# Patient Record
Sex: MALE | Race: WHITE | NOT HISPANIC OR LATINO | Employment: OTHER | URBAN - METROPOLITAN AREA
[De-identification: names, ages, dates, MRNs, and addresses within clinical notes are randomized per-mention and may not be internally consistent; named-entity substitution may affect disease eponyms.]

---

## 2024-07-23 ENCOUNTER — TELEPHONE (OUTPATIENT)
Dept: NEUROLOGY | Facility: CLINIC | Age: 69
End: 2024-07-23

## 2024-07-23 ENCOUNTER — APPOINTMENT (OUTPATIENT)
Dept: MRI IMAGING | Facility: HOSPITAL | Age: 69
End: 2024-07-23
Payer: COMMERCIAL

## 2024-07-23 ENCOUNTER — HOSPITAL ENCOUNTER (OUTPATIENT)
Facility: HOSPITAL | Age: 69
Setting detail: OBSERVATION
Discharge: HOME | End: 2024-07-24
Attending: EMERGENCY MEDICINE | Admitting: EMERGENCY MEDICINE
Payer: COMMERCIAL

## 2024-07-23 DIAGNOSIS — Z09 FOLLOW-UP EXAM: ICD-10-CM

## 2024-07-23 DIAGNOSIS — Z86.79 HISTORY OF HYPERTENSION: ICD-10-CM

## 2024-07-23 DIAGNOSIS — R20.2 NUMBNESS AND TINGLING OF RIGHT ARM: Primary | ICD-10-CM

## 2024-07-23 DIAGNOSIS — Z86.73 HISTORY OF CVA (CEREBROVASCULAR ACCIDENT): ICD-10-CM

## 2024-07-23 DIAGNOSIS — I10 HYPERTENSION, UNSPECIFIED TYPE: ICD-10-CM

## 2024-07-23 DIAGNOSIS — R20.0 NUMBNESS AND TINGLING OF RIGHT ARM: Primary | ICD-10-CM

## 2024-07-23 PROBLEM — I63.9 ACUTE CVA (CEREBROVASCULAR ACCIDENT): Status: ACTIVE | Noted: 2024-07-23

## 2024-07-23 LAB
ANION GAP SERPL CALCULATED.3IONS-SCNC: 10 MMOL/L (ref 5–15)
APTT PPP: 26.2 SECONDS (ref 22.7–35.4)
BASOPHILS # BLD AUTO: 0.05 10*3/MM3 (ref 0–0.2)
BASOPHILS NFR BLD AUTO: 0.5 % (ref 0–1.5)
BILIRUB UR QL STRIP: NEGATIVE
BUN SERPL-MCNC: 16 MG/DL (ref 8–23)
BUN/CREAT SERPL: 14.8 (ref 7–25)
CALCIUM SPEC-SCNC: 9 MG/DL (ref 8.6–10.5)
CHLORIDE SERPL-SCNC: 104 MMOL/L (ref 98–107)
CHOLEST SERPL-MCNC: 149 MG/DL (ref 0–200)
CLARITY UR: CLEAR
CO2 SERPL-SCNC: 26 MMOL/L (ref 22–29)
COLOR UR: YELLOW
CREAT SERPL-MCNC: 1.08 MG/DL (ref 0.76–1.27)
DEPRECATED RDW RBC AUTO: 40.3 FL (ref 37–54)
EGFRCR SERPLBLD CKD-EPI 2021: 74.3 ML/MIN/1.73
EOSINOPHIL # BLD AUTO: 0.19 10*3/MM3 (ref 0–0.4)
EOSINOPHIL NFR BLD AUTO: 1.7 % (ref 0.3–6.2)
ERYTHROCYTE [DISTWIDTH] IN BLOOD BY AUTOMATED COUNT: 12.3 % (ref 12.3–15.4)
GEN 5 2HR TROPONIN T REFLEX: 9 NG/L
GLUCOSE BLDC GLUCOMTR-MCNC: 101 MG/DL (ref 70–130)
GLUCOSE SERPL-MCNC: 87 MG/DL (ref 65–99)
GLUCOSE UR STRIP-MCNC: NEGATIVE MG/DL
HBA1C MFR BLD: 5.5 % (ref 4.8–5.6)
HCT VFR BLD AUTO: 47.1 % (ref 37.5–51)
HDLC SERPL-MCNC: 49 MG/DL (ref 40–60)
HGB BLD-MCNC: 15.1 G/DL (ref 13–17.7)
HGB UR QL STRIP.AUTO: NEGATIVE
IMM GRANULOCYTES # BLD AUTO: 0.03 10*3/MM3 (ref 0–0.05)
IMM GRANULOCYTES NFR BLD AUTO: 0.3 % (ref 0–0.5)
INR PPP: 1.04 (ref 0.9–1.1)
KETONES UR QL STRIP: NEGATIVE
LDLC SERPL CALC-MCNC: 86 MG/DL (ref 0–100)
LDLC/HDLC SERPL: 1.76 {RATIO}
LEUKOCYTE ESTERASE UR QL STRIP.AUTO: NEGATIVE
LYMPHOCYTES # BLD AUTO: 4.85 10*3/MM3 (ref 0.7–3.1)
LYMPHOCYTES NFR BLD AUTO: 44.3 % (ref 19.6–45.3)
MAGNESIUM SERPL-MCNC: 2.6 MG/DL (ref 1.6–2.4)
MCH RBC QN AUTO: 28.9 PG (ref 26.6–33)
MCHC RBC AUTO-ENTMCNC: 32.1 G/DL (ref 31.5–35.7)
MCV RBC AUTO: 90.2 FL (ref 79–97)
MONOCYTES # BLD AUTO: 0.76 10*3/MM3 (ref 0.1–0.9)
MONOCYTES NFR BLD AUTO: 6.9 % (ref 5–12)
NEUTROPHILS NFR BLD AUTO: 46.3 % (ref 42.7–76)
NEUTROPHILS NFR BLD AUTO: 5.08 10*3/MM3 (ref 1.7–7)
NITRITE UR QL STRIP: NEGATIVE
NRBC BLD AUTO-RTO: 0 /100 WBC (ref 0–0.2)
PA ADP PRP-ACNC: 130 PRU (ref 194–418)
PH UR STRIP.AUTO: 6.5 [PH] (ref 5–8)
PLATELET # BLD AUTO: 350 10*3/MM3 (ref 140–450)
PMV BLD AUTO: 9.2 FL (ref 6–12)
POTASSIUM SERPL-SCNC: 4 MMOL/L (ref 3.5–5.2)
PROT UR QL STRIP: NEGATIVE
PROTHROMBIN TIME: 13.8 SECONDS (ref 11.7–14.2)
QT INTERVAL: 383 MS
QTC INTERVAL: 420 MS
RBC # BLD AUTO: 5.22 10*6/MM3 (ref 4.14–5.8)
SODIUM SERPL-SCNC: 140 MMOL/L (ref 136–145)
SP GR UR STRIP: 1.01 (ref 1–1.03)
TRIGL SERPL-MCNC: 69 MG/DL (ref 0–150)
TROPONIN T DELTA: 1 NG/L
TROPONIN T SERPL HS-MCNC: 8 NG/L
UROBILINOGEN UR QL STRIP: NORMAL
VLDLC SERPL-MCNC: 14 MG/DL (ref 5–40)
WBC NRBC COR # BLD AUTO: 10.96 10*3/MM3 (ref 3.4–10.8)

## 2024-07-23 PROCEDURE — 80048 BASIC METABOLIC PNL TOTAL CA: CPT | Performed by: EMERGENCY MEDICINE

## 2024-07-23 PROCEDURE — 85025 COMPLETE CBC W/AUTO DIFF WBC: CPT | Performed by: EMERGENCY MEDICINE

## 2024-07-23 PROCEDURE — 80061 LIPID PANEL: CPT | Performed by: PHYSICIAN ASSISTANT

## 2024-07-23 PROCEDURE — 0 GADOBENATE DIMEGLUMINE 529 MG/ML SOLUTION: Performed by: EMERGENCY MEDICINE

## 2024-07-23 PROCEDURE — 81003 URINALYSIS AUTO W/O SCOPE: CPT | Performed by: EMERGENCY MEDICINE

## 2024-07-23 PROCEDURE — A9577 INJ MULTIHANCE: HCPCS | Performed by: EMERGENCY MEDICINE

## 2024-07-23 PROCEDURE — 85576 BLOOD PLATELET AGGREGATION: CPT

## 2024-07-23 PROCEDURE — 85610 PROTHROMBIN TIME: CPT | Performed by: EMERGENCY MEDICINE

## 2024-07-23 PROCEDURE — 82948 REAGENT STRIP/BLOOD GLUCOSE: CPT

## 2024-07-23 PROCEDURE — G0378 HOSPITAL OBSERVATION PER HR: HCPCS

## 2024-07-23 PROCEDURE — 85730 THROMBOPLASTIN TIME PARTIAL: CPT | Performed by: EMERGENCY MEDICINE

## 2024-07-23 PROCEDURE — 83735 ASSAY OF MAGNESIUM: CPT | Performed by: EMERGENCY MEDICINE

## 2024-07-23 PROCEDURE — 93010 ELECTROCARDIOGRAM REPORT: CPT | Performed by: INTERNAL MEDICINE

## 2024-07-23 PROCEDURE — 99204 OFFICE O/P NEW MOD 45 MIN: CPT

## 2024-07-23 PROCEDURE — 36415 COLL VENOUS BLD VENIPUNCTURE: CPT

## 2024-07-23 PROCEDURE — 25810000003 SODIUM CHLORIDE 0.9 % SOLUTION: Performed by: EMERGENCY MEDICINE

## 2024-07-23 PROCEDURE — 99285 EMERGENCY DEPT VISIT HI MDM: CPT

## 2024-07-23 PROCEDURE — 83036 HEMOGLOBIN GLYCOSYLATED A1C: CPT | Performed by: PHYSICIAN ASSISTANT

## 2024-07-23 PROCEDURE — 84484 ASSAY OF TROPONIN QUANT: CPT | Performed by: EMERGENCY MEDICINE

## 2024-07-23 PROCEDURE — 70553 MRI BRAIN STEM W/O & W/DYE: CPT

## 2024-07-23 PROCEDURE — 93005 ELECTROCARDIOGRAM TRACING: CPT | Performed by: EMERGENCY MEDICINE

## 2024-07-23 RX ORDER — ATORVASTATIN CALCIUM 80 MG/1
80 TABLET, FILM COATED ORAL DAILY
COMMUNITY
Start: 2024-07-20

## 2024-07-23 RX ORDER — ONDANSETRON 2 MG/ML
4 INJECTION INTRAMUSCULAR; INTRAVENOUS EVERY 6 HOURS PRN
Status: DISCONTINUED | OUTPATIENT
Start: 2024-07-23 | End: 2024-07-24 | Stop reason: HOSPADM

## 2024-07-23 RX ORDER — CLOPIDOGREL BISULFATE 75 MG/1
75 TABLET ORAL DAILY
COMMUNITY
Start: 2024-07-20

## 2024-07-23 RX ORDER — METHYLPREDNISOLONE 4 MG/1
TABLET ORAL
COMMUNITY
Start: 2024-03-29

## 2024-07-23 RX ORDER — SODIUM CHLORIDE 0.9 % (FLUSH) 0.9 %
10 SYRINGE (ML) INJECTION AS NEEDED
Status: DISCONTINUED | OUTPATIENT
Start: 2024-07-23 | End: 2024-07-24 | Stop reason: HOSPADM

## 2024-07-23 RX ORDER — AMLODIPINE BESYLATE 5 MG/1
5 TABLET ORAL DAILY
Status: DISCONTINUED | OUTPATIENT
Start: 2024-07-23 | End: 2024-07-24 | Stop reason: HOSPADM

## 2024-07-23 RX ORDER — AMLODIPINE BESYLATE 5 MG/1
5 TABLET ORAL DAILY
COMMUNITY
Start: 2024-05-13

## 2024-07-23 RX ORDER — POTASSIUM CHLORIDE 20 MEQ/1
TABLET, EXTENDED RELEASE ORAL
COMMUNITY
Start: 2024-07-20

## 2024-07-23 RX ORDER — DIAZEPAM 5 MG/1
5 TABLET ORAL ONCE
Status: COMPLETED | OUTPATIENT
Start: 2024-07-23 | End: 2024-07-23

## 2024-07-23 RX ORDER — SODIUM CHLORIDE 0.9 % (FLUSH) 0.9 %
10 SYRINGE (ML) INJECTION EVERY 12 HOURS SCHEDULED
Status: DISCONTINUED | OUTPATIENT
Start: 2024-07-23 | End: 2024-07-24 | Stop reason: HOSPADM

## 2024-07-23 RX ORDER — CLOPIDOGREL BISULFATE 75 MG/1
75 TABLET ORAL DAILY
Status: DISCONTINUED | OUTPATIENT
Start: 2024-07-24 | End: 2024-07-23

## 2024-07-23 RX ORDER — SODIUM CHLORIDE 9 MG/ML
40 INJECTION, SOLUTION INTRAVENOUS AS NEEDED
Status: DISCONTINUED | OUTPATIENT
Start: 2024-07-23 | End: 2024-07-24 | Stop reason: HOSPADM

## 2024-07-23 RX ORDER — ATORVASTATIN CALCIUM 80 MG/1
80 TABLET, FILM COATED ORAL DAILY
Status: DISCONTINUED | OUTPATIENT
Start: 2024-07-23 | End: 2024-07-24 | Stop reason: HOSPADM

## 2024-07-23 RX ORDER — ATORVASTATIN CALCIUM 20 MG/1
40 TABLET, FILM COATED ORAL NIGHTLY
Status: DISCONTINUED | OUTPATIENT
Start: 2024-07-23 | End: 2024-07-23

## 2024-07-23 RX ORDER — COLCHICINE 0.6 MG/1
TABLET ORAL
COMMUNITY
Start: 2024-03-29

## 2024-07-23 RX ORDER — CLOPIDOGREL BISULFATE 75 MG/1
75 TABLET ORAL DAILY
Status: DISCONTINUED | OUTPATIENT
Start: 2024-07-24 | End: 2024-07-24 | Stop reason: HOSPADM

## 2024-07-23 RX ADMIN — DIAZEPAM 5 MG: 5 TABLET ORAL at 21:25

## 2024-07-23 RX ADMIN — SODIUM CHLORIDE 1000 ML: 9 INJECTION, SOLUTION INTRAVENOUS at 16:28

## 2024-07-23 RX ADMIN — GADOBENATE DIMEGLUMINE 19 ML: 529 INJECTION, SOLUTION INTRAVENOUS at 22:19

## 2024-07-23 RX ADMIN — AMLODIPINE BESYLATE 5 MG: 5 TABLET ORAL at 21:25

## 2024-07-23 NOTE — ED TRIAGE NOTES
Patient to ED via pv from home. Patient c/o right arm tingling that began this morning around 0600. Wife reports that patient's speech has seemed slurred today as well. Patient reports he had similar symptoms on Friday.

## 2024-07-23 NOTE — ED PROVIDER NOTES
EMERGENCY DEPARTMENT ENCOUNTER    Room Number:  34/34  PCP: Chelsey Palmoo PA  Historian: Patient      HPI:  Chief Complaint: Slurred speech, right arm tingling and weakness  A complete HPI/ROS/PMH/PSH/SH/FH are unobtainable due to: None    Context: Frederick Gross is a 69 y.o. male who presents to the ED via private vehicle c/o acute recurrence of strokelike symptoms, initially diagnosed with a stroke in [place] on Friday.  Had right upper extremity tingling and weakness then with some slight slurred speech.  Has chronic mild right facial droop which appears to be similar.  Has been lightheaded, no spinning dizziness.  No chest pain or shortness of breath.  Symptoms recurred starting this morning around 6 AM.  Was started on Plavix and Lipitor posthospitalization for stroke recently.      MEDICAL RECORD REVIEW    External (non-ED) record review: Accompanying paperwork with patient is reviewed and shows the patient had a left external capsule infarct              PAST MEDICAL HISTORY  Active Ambulatory Problems     Diagnosis Date Noted    No Active Ambulatory Problems     Resolved Ambulatory Problems     Diagnosis Date Noted    No Resolved Ambulatory Problems     No Additional Past Medical History         PAST SURGICAL HISTORY  No past surgical history on file.      FAMILY HISTORY  No family history on file.      SOCIAL HISTORY  Social History     Socioeconomic History    Marital status: Significant Other         ALLERGIES  Aspirin        REVIEW OF SYSTEMS  Review of Systems     All systems reviewed and negative except for those discussed in HPI.       PHYSICAL EXAM    I have reviewed the triage vital signs and nursing notes.    ED Triage Vitals [07/23/24 1548]   Temp Heart Rate Resp BP SpO2   98.3 °F (36.8 °C) 94 16 144/88 96 %      Temp src Heart Rate Source Patient Position BP Location FiO2 (%)   -- -- -- -- --       Physical Exam  General: No acute distress, nontoxic  HEENT: Mucous membranes moist,  atraumatic, EOMI  Neck: Full ROM  Pulm: Symmetric chest rise, nonlabored, lungs CTAB  Cardiovascular: Regular rate and rhythm, intact distal pulses  GI: Soft, nontender, nondistended, no rebound, no guarding, bowel sounds present  MSK: Full ROM, no deformity  Skin: Warm, dry  Neuro: Awake, alert, oriented x 4, GCS 15, mild right facial droop, trace dysarthria, no aphasia, slight sensory decreased in the distal right upper extremity, normal strength bilateral upper and lower extremities, no ataxia, no pronator drift, moving all extremities, no focal deficits  Psych: Calm, cooperative      Interval: baseline  1a. Level of Consciousness: 0-->Alert, keenly responsive  1b. LOC Questions: 0-->Answers both questions correctly  1c. LOC Commands: 0-->Performs both tasks correctly  2. Best Gaze: 0-->Normal  3. Visual: 0-->No visual loss  4. Facial Palsy: 1-->Minor paralysis (flattened nasolabial fold, asymmetry on smiling)  5a. Motor Arm, Left: 0-->No drift, limb holds 90 (or 45) degrees for full 10 secs  5b. Motor Arm, Right: 0-->No drift, limb holds 90 (or 45) degrees for full 10 secs  6a. Motor Leg, Left: 0-->No drift, leg holds 30 degree position for full 5 secs  6b. Motor Leg, Right: 0-->No drift, leg holds 30 degree position for full 5 secs  7. Limb Ataxia: 0-->Absent  8. Sensory: 1-->Mild-to-moderate sensory loss, patient feels pinprick is less sharp or is dull on the affected side, or there is a loss of superficial pain with pinprick, but patient is aware of being touched  9. Best Language: 0-->No aphasia, normal  10. Dysarthria: 1-->Mild-to-moderate dysarthria, patient slurs at least some words and, at worst, can be understood with some difficulty  11. Extinction and Inattention (formerly Neglect): 0-->No abnormality    Total (NIH Stroke Scale): 3      LAB RESULTS  Recent Results (from the past 24 hour(s))   ECG 12 Lead Stroke Evaluation    Collection Time: 07/23/24  4:07 PM   Result Value Ref Range    QT Interval  383 ms    QTC Interval 420 ms   Urinalysis With Microscopic If Indicated (No Culture) - Urine, Clean Catch    Collection Time: 07/23/24  4:21 PM    Specimen: Urine, Clean Catch   Result Value Ref Range    Color, UA Yellow Yellow, Straw    Appearance, UA Clear Clear    pH, UA 6.5 5.0 - 8.0    Specific Gravity, UA 1.012 1.005 - 1.030    Glucose, UA Negative Negative    Ketones, UA Negative Negative    Bilirubin, UA Negative Negative    Blood, UA Negative Negative    Protein, UA Negative Negative    Leuk Esterase, UA Negative Negative    Nitrite, UA Negative Negative    Urobilinogen, UA 1.0 E.U./dL 0.2 - 1.0 E.U./dL   Basic Metabolic Panel    Collection Time: 07/23/24  4:25 PM    Specimen: Blood   Result Value Ref Range    Glucose 87 65 - 99 mg/dL    BUN 16 8 - 23 mg/dL    Creatinine 1.08 0.76 - 1.27 mg/dL    Sodium 140 136 - 145 mmol/L    Potassium 4.0 3.5 - 5.2 mmol/L    Chloride 104 98 - 107 mmol/L    CO2 26.0 22.0 - 29.0 mmol/L    Calcium 9.0 8.6 - 10.5 mg/dL    BUN/Creatinine Ratio 14.8 7.0 - 25.0    Anion Gap 10.0 5.0 - 15.0 mmol/L    eGFR 74.3 >60.0 mL/min/1.73   Protime-INR    Collection Time: 07/23/24  4:25 PM    Specimen: Blood   Result Value Ref Range    Protime 13.8 11.7 - 14.2 Seconds    INR 1.04 0.90 - 1.10   aPTT    Collection Time: 07/23/24  4:25 PM    Specimen: Blood   Result Value Ref Range    PTT 26.2 22.7 - 35.4 seconds   High Sensitivity Troponin T    Collection Time: 07/23/24  4:25 PM    Specimen: Blood   Result Value Ref Range    HS Troponin T 8 <22 ng/L   Magnesium    Collection Time: 07/23/24  4:25 PM    Specimen: Blood   Result Value Ref Range    Magnesium 2.6 (H) 1.6 - 2.4 mg/dL   CBC Auto Differential    Collection Time: 07/23/24  4:25 PM    Specimen: Blood   Result Value Ref Range    WBC 10.96 (H) 3.40 - 10.80 10*3/mm3    RBC 5.22 4.14 - 5.80 10*6/mm3    Hemoglobin 15.1 13.0 - 17.7 g/dL    Hematocrit 47.1 37.5 - 51.0 %    MCV 90.2 79.0 - 97.0 fL    MCH 28.9 26.6 - 33.0 pg    MCHC 32.1 31.5  - 35.7 g/dL    RDW 12.3 12.3 - 15.4 %    RDW-SD 40.3 37.0 - 54.0 fl    MPV 9.2 6.0 - 12.0 fL    Platelets 350 140 - 450 10*3/mm3    Neutrophil % 46.3 42.7 - 76.0 %    Lymphocyte % 44.3 19.6 - 45.3 %    Monocyte % 6.9 5.0 - 12.0 %    Eosinophil % 1.7 0.3 - 6.2 %    Basophil % 0.5 0.0 - 1.5 %    Immature Grans % 0.3 0.0 - 0.5 %    Neutrophils, Absolute 5.08 1.70 - 7.00 10*3/mm3    Lymphocytes, Absolute 4.85 (H) 0.70 - 3.10 10*3/mm3    Monocytes, Absolute 0.76 0.10 - 0.90 10*3/mm3    Eosinophils, Absolute 0.19 0.00 - 0.40 10*3/mm3    Basophils, Absolute 0.05 0.00 - 0.20 10*3/mm3    Immature Grans, Absolute 0.03 0.00 - 0.05 10*3/mm3    nRBC 0.0 0.0 - 0.2 /100 WBC       Ordered the above labs and independently interpreted results. My findings will be discussed in the medical decision making section below        RADIOLOGY  No Radiology Exams Resulted Within Past 24 Hours    Ordered the above noted radiological studies.  Independently interpreted by me and my independent review of findings can be found in the ED Course.  See dictation for official radiology interpretation.      PROCEDURES    Procedures      EKG - Per my independent interpretation at 1610:    EKG Time: 1607  Rhythm/Rate: Sinus rhythm with a rate of 72  Normal axis  Normal intervals  No acute ischemic changes  No STEMI       No prior for comparison      MEDICATIONS GIVEN IN ER    Medications   sodium chloride 0.9 % bolus 1,000 mL (1,000 mL Intravenous New Bag 7/23/24 1628)         PROGRESS, DATA ANALYSIS, CONSULTS, AND MEDICAL DECISION MAKING    Please note that this section constitutes my independent interpretation of clinical data including lab results, radiology, EKG's.  This constitutes my independent professional opinion regarding differential diagnosis and management of this patient.  It may include any factors such as history from outside sources, review of external records, social determinants of health, management of medications, response to those  treatments, and discussions with other providers.    Differential Diagnosis and Plan: Initial concern for recurrence of recent strokelike symptoms, dehydration, renal failure, electrolyte abnormalities, evolving or new stroke, arrhythmia, among others.  Plan for labs, EKG, stat neurology consult, likely repeat MRI and reevaluate with results.    Additional sources:  - Discussed/ obtained information from independent historians:       - (Social Determinants of Health): None     - Shared decision making:  Patient fully updated on and in agreement with the course and plan moving forward    ED Course as of 07/23/24 1719 Tue Jul 23, 2024   1628 Discussed with Dr. Ferreira, stroke neurology, discussed patient's clinical course and findings today, treatment modalities, and will plan for observation unit stay for MRI and neurology consultation.  Does not need further vessel imaging at this time. [DC]   1656 WBC(!): 10.96 [DC]   1656 Hemoglobin: 15.1 [DC]   1656 Platelets: 350 [DC]   1656 Nitrite, UA: Negative [DC]   1656 Leukocytes, UA: Negative [DC]   1657 Blood, UA: Negative [DC]   1657 Ketones, UA: Negative [DC]   1713 Glucose: 87 [DC]   1713 BUN: 16 [DC]   1713 Creatinine: 1.08 [DC]   1713 Sodium: 140 [DC]   1713 Potassium: 4.0 [DC]   1713 INR: 1.04 [DC]   1713 PTT: 26.2 [DC]   1713 HS Troponin T: 8 [DC]   1718 Discussed with MEL Kaye, observation unit, discussed patient's clinical course and findings today, treatment modalities, neurology consult and recommendation, and need for observation unit stay. [DC]   1718 On reevaluation, patient's symptoms have improved though not entirely resolved, he has been updated on the findings and discussions with observation unit and neurology and need for MRI and further monitoring.  All questions and concerns addressed. [DC]      ED Course User Index  [DC] Joaquin Lala MD       Hospitalization Considered?: yes    Orders Placed During This Visit:  Orders Placed This  Encounter   Procedures    MRI Brain Without Contrast    Basic Metabolic Panel    Urinalysis With Microscopic If Indicated (No Culture) - Urine, Clean Catch    Protime-INR    aPTT    High Sensitivity Troponin T    Magnesium    CBC Auto Differential    High Sensitivity Troponin T 2Hr    Inpatient Neurology Consult Stroke    ECG 12 Lead Stroke Evaluation    CBC & Differential       Additional orders considered but not placed:      Independent interpretation of labs, radiology studies, and discussions with consultants: See ED Course        AS OF 17:19 EDT VITALS:    BP - 146/96  HR - 71  TEMP - 98.3 °F (36.8 °C)  02 SATS - 97%          DIAGNOSIS  Final diagnoses:   Numbness and tingling of right arm   History of CVA (cerebrovascular accident)   Hypertension, unspecified type   History of hypertension         DISPOSITION  ED Disposition       ED Disposition   Decision to Admit    Condition   --    Comment   --                Please note that portions of this document were completed with a voice recognition program.    Note Disclaimer: At [facility], we believe that sharing information builds trust and better relationships. You are receiving this note because you recently visited [facility]. It is possible you will see health information before a provider has talked with you about it. This kind of information can be easy to misunderstand. To help you fully understand what it means for your health, we urge you to discuss this note with your provider.                       Joaquin Lala MD  07/23/24 6972

## 2024-07-23 NOTE — H&P
[facility]   HISTORY AND PHYSICAL    Patient Name: Frederick Gross  : 1955  MRN: [id number]  Primary Care Physician:  Chelsey Palomo PA  Date of admission: 2024    Subjective   Subjective     Chief Complaint:   Chief Complaint   Patient presents with    Tingling         HPI:    Frederick Gross is a 69 y.o. male who presented to the ED via private vehicle complaining of acute recurrence of strokelike symptoms.  Patient was initially diagnosed with a stroke in [place] on Friday.  He had upper extremity tingling and weakness and then some slight slurred speech.  Has residual mild facial droop which is reported to be similar.  Patient reports he has been lightheaded but no spinning or dizziness.  No chest pain or shortness of breath.  Symptoms began to recur this morning around 6 AM.  Patient has an ASA allergy [place] started on Plavix and Lipitor when discharged from the hospital in [facility] where he was seen for the initial stroke.  Accompanying paperwork showed that the patient had a left external capsular infarct.  Patient's case was discussed with stroke neurology and they recommended MRI of the brain and admission to the observation unit.    ED workup showed a troponin of 8 9 with a delta of 1, sodium 140, potassium 4, anion gap 10, CO2 26, creatinine 1.08, glucose 87, INR 1.04, PTT 26.2, WBC 10.96, hemoglobin 15.1, platelets 350.  UA was negative.  NIH was a 3.  ECG was done at 1607 and showed sinus with a rate of 72, normal axis, normal intervals, no acute ischemic changes, no STEMI.    Review of Systems   All systems were reviewed and negative except for: That listed above    Personal History     Past Medical History:   Diagnosis Date    Arthritis        Past Surgical History:   Procedure Laterality Date    HERNIA REPAIR      NASAL POLYP SURGERY      TONSILLECTOMY         Family History: family history is not on file. Otherwise pertinent FHx was reviewed and not pertinent to  current issue.    Social History:  reports that he has quit smoking. His smoking use included cigarettes. He does not have any smokeless tobacco history on file. He reports current alcohol use. He reports that he does not use drugs.    Home Medications:  amLODIPine, atorvastatin, clopidogrel, colchicine, diphenhydrAMINE-acetaminophen, methylPREDNISolone, and potassium chloride    Allergies:  Allergies   Allergen Reactions    Aspirin Swelling       Objective   Objective     Vitals:   Temp:  [98.2 °F (36.8 °C)-98.3 °F (36.8 °C)] 98.2 °F (36.8 °C)  Heart Rate:  [65-94] 65  Resp:  [14-16] 14  BP: (144-148)/(79-96) 144/79  Physical Exam    Constitutional: Awake, alert   Eyes: PERRLA, sclerae anicteric, no conjunctival injection   HENT: NCAT, mucous membranes moist   Neck: Supple, no thyromegaly, no lymphadenopathy, trachea midline   Respiratory: Clear to auscultation bilaterally, nonlabored respirations    Cardiovascular: RRR, no murmurs, rubs, or gallops, palpable pedal pulses bilaterally   Gastrointestinal: Positive bowel sounds, soft, nontender, nondistended   Musculoskeletal: No bilateral ankle edema, no clubbing or cyanosis to extremities   Psychiatric: Appropriate affect, cooperative   Neurologic: Oriented x 4, mild right facial droop, trace dysarthria, no aphasia, slight sensory decreased in the distal right upper extremity, normal strength bilateral upper and lower extremities, no ataxia, no drift, moves all extremities   skin: No rashes     Result Review    Result Review:  I have personally reviewed the results from the time of this admission to 7/23/2024 19:50 EDT and agree with these findings:  [x]  Laboratory list / accordion  []  Microbiology  [x]  Radiology  [x]  EKG/Telemetry   []  Cardiology/Vascular   []  Pathology  []  Old records  []  Other:        Assessment & Plan   Assessment / Plan     Brief Patient Summary:  Frederick Gross is a 69 y.o. male who was admitted to the ED observation unit for worsening  stroke symptoms to include slurred speech and right arm tingling and weakness.  Stroke neurology following and recommended MRI brain without contrast to further evaluate.  Patient has ASA allergy and is taking Plavix and Lipitor daily.    Active Hospital Problems:  Active Hospital Problems    Diagnosis     **Acute CVA (cerebrovascular accident)      Plan:     Left external capsular infarct/slurred speech, right arm tingling and weakness  -MRI brain to further evaluate  -Neurology following  -Continue Lipitor and Plavix  -Serial neurochecks  -Cardiac monitoring    Hyperlipidemia  -Continue Plavix as above    History of hypokalemia  -Trend potassium and p.o. potassium if needed  -Repeat BMP in the morning      VTE Prophylaxis:  Mechanical VTE prophylaxis orders are present.        CODE STATUS:    Level Of Support Discussed With: Patient  Code Status (Patient has no pulse and is not breathing): CPR (Attempt to Resuscitate)  Medical Interventions (Patient has pulse or is breathing): Full Support    Admission Status:  I believe this patient meets observation status.    Electronically signed by Gagandeep Dubon III, PA, 07/23/24, 6:59 PM EDT.        75 minutes has been spent by [facility] providers in the care of this patient while under observation status      I have worn appropriate PPE during this patient encounter, sanitized my hands both with entering and exiting patient's room.    I have discussed plan of care with patient including advance care plan and/or surrogate decision maker.  Patient advises that their significant other/Maria Del Rosario White will be their primary surrogate decision maker

## 2024-07-23 NOTE — TELEPHONE ENCOUNTER
Called patient to schedule him with Dr. Madden on 8/26/2024.  Patient felt that he needed to be seen sooner, no sooner appointments available at this time.  Patient stated that he will find another provider.  
EOAE (evoked otoacoustic emission)

## 2024-07-23 NOTE — CONSULTS
Neurology Consult Note    Consult Date: 7/23/2024    Referring MD: No ref. provider found    Reason for Consult I have been asked to see the patient in neurological consultation to render advice and opinion regarding right arm numbness, slurred speech and facial droop.    Frederick Gross is a 69 y.o. male with PMH of HTN and arthritis presents to the ED with right arm numbness and slurred speech since 0600 this morning. He states this symptoms primarily involved his forearm and all of his finger tips on his right side. He said he also noticed he had difficulty writing. Wife said she noticed slurred speech but the patient did not appreciate this. He denies any unilateral weakness, dizziness, speech or visual deficits. He states symptoms had resolved around 3 pm today. BP on arrival was 144/88 mmHg and pulse was 94 bpm. Blood glucose was 87 on arrival. Patient states he had not drank much water over the last couple of days and that he drinks a cup of coffee daily and yesterday had a tea. He denies alcohol use or tobacco use.     Patient state he was seen for the same symptoms last Friday which had resolved yesterday. He was seen in [place] for these symptoms that started when him and his wife were driving down to the [place]. He had MRI brain at the time which demonstrated left internal capsule stroke. He was discharged on Plavix 75 mg daily and Lipitor 80 mg daily. He cannot take aspirin due to NSAID allergy.     Past Medical/Surgical Hx:  Past Medical History:   Diagnosis Date    Arthritis      Past Surgical History:   Procedure Laterality Date    HERNIA REPAIR      NASAL POLYP SURGERY      TONSILLECTOMY         Medications On Admission  Medications Prior to Admission   Medication Sig Dispense Refill Last Dose    amLODIPine (NORVASC) 5 MG tablet Take 1 tablet by mouth Daily.       atorvastatin (LIPITOR) 80 MG tablet Take 1 tablet by mouth Daily.       clopidogrel (PLAVIX) 75 MG tablet Take 1 tablet by mouth Daily.   " 7/23/2024    colchicine 0.6 MG tablet Take 2 now and repeat with one in one hour.       methylPREDNISolone (MEDROL) 4 MG dose pack        potassium chloride (KLOR-CON M20) 20 MEQ CR tablet        diphenhydrAMINE-acetaminophen (TYLENOL PM)  MG tablet per tablet Take 1 tablet by mouth.          Allergies:  Allergies   Allergen Reactions    Aspirin Swelling       Social Hx:  Social History     Socioeconomic History    Marital status: Significant Other   Tobacco Use    Smoking status: Former     Types: Cigarettes   Vaping Use    Vaping status: Never Used   Substance and Sexual Activity    Alcohol use: Yes     Comment: beers a week    Drug use: Never    Sexual activity: Yes       Family Hx:  History reviewed. No pertinent family history.      Exam    /79 (BP Location: Left arm, Patient Position: Lying)   Pulse 65   Temp 98.2 °F (36.8 °C) (Oral)   Resp 14   Ht 175.3 cm (69\")   Wt 90.7 kg (200 lb)   SpO2 95%   BMI 29.53 kg/m²   gen: NAD, vitals reviewed  MS: oriented x3, recent/remote memory intact, normal attention/concentration, language intact, no neglect, normal fund of knowledge  CN: visual acuity grossly normal, visual fields full, PERRL, EOMI, facial sensation equal, mild right facial droop, hearing symmetric, palate elevates symmetrically, shoulder shrug equal, tongue midline  Motor: 5/5 throughout upper and lower extremities, normal tone  Sensation: intact to vibration and cold temperature throughout  Coordination: no dysmetria with finger to nose or heel to shin bilaterally, normal finger to thumb tap bilaterally, slightly revolving left hand around right hand.   Gait: no ataxia, normal station    DATA:    Lab Results   Component Value Date    GLUCOSE 87 07/23/2024    CALCIUM 9.0 07/23/2024     07/23/2024    K 4.0 07/23/2024    CO2 26.0 07/23/2024     07/23/2024    BUN 16 07/23/2024    CREATININE 1.08 07/23/2024    BCR 14.8 07/23/2024    ANIONGAP 10.0 07/23/2024     Lab Results "   Component Value Date    WBC 10.96 (H) 07/23/2024    HGB 15.1 07/23/2024    HCT 47.1 07/23/2024    MCV 90.2 07/23/2024     07/23/2024     Lab Results   Component Value Date    LDL 86 07/23/2024     Lab Results   Component Value Date    HGBA1C 5.50 07/23/2024     Lab Results   Component Value Date    INR 1.04 07/23/2024    PROTIME 13.8 07/23/2024       Lab review:   Glucose 87  WBC 10.96    Imaging review:     Diagnoses:  Transient Right arm numbness with dysarthria: Patient diagnosed with left internal capsule infarct Friday in [facility]. He was discharged on Plavix and Lipitor. Patient was asymptomatic since Sunday and developed symptoms from 6 am to 3 p, today now fully resolved. Patient admits to not drinking much water over the past couple of day and drinking coffee daily. Patient symptoms could possibly be recrudescence of previous stroke or could be new stroke so will obtain repeat Brain MRI with and without contrast. Would be great to get  imaging from Friday uploaded into epic as well.  HTN  Arthritis    PLAN:   Continue Plavix 75 mg daily and Lipitor 80 mg daily  MRI brain with and without contrast  Maintain permissive HTN for 24-48hrs, do not treat unless BP > 220/120 if no contraindication  Perform bedside swallow test  Telemetry to monitor for arrhythmia  VTE prophylaxis  Neuro checks, if change in exam call neuro oncall  PT/OT when appropriate   P2Y12 response  Would be great to have previous MRI brain, CTA head and neck, carotid ultrasound and CTH uploaded into chart.  Educated to avoid dehydration and hypotension. Drink at least 2 L of water daily, limit caffeine and alcohol    Recommendations discussed with Dr. Madden who is in agreement with plan.

## 2024-07-23 NOTE — ED NOTES
"..Nursing report ED to floor  Frederick Gross  69 y.o.  male    HPI :  HPI (Adult)  Stated Reason for Visit: right arm tingling, onset 4 days ago  History Obtained From: patient    Chief Complaint  Chief Complaint   Patient presents with    Tingling       Admitting doctor:   Enrrique Bingham MD    Admitting diagnosis:   The primary encounter diagnosis was Numbness and tingling of right arm. Diagnoses of History of CVA (cerebrovascular accident), Hypertension, unspecified type, and History of hypertension were also pertinent to this visit.    Code status:   Current Code Status       Date Active Code Status Order ID Comments User Context       7/23/2024 1738 CPR (Attempt to Resuscitate) [id number]  Gagandeep Dubon III, PA ED        Question Answer    Code Status (Patient has no pulse and is not breathing) CPR (Attempt to Resuscitate)    Medical Interventions (Patient has pulse or is breathing) Full Support    Level Of Support Discussed With Patient                    Allergies:   Aspirin    Isolation:   No active isolations    Intake and Output    Intake/Output Summary (Last 24 hours) at 7/23/2024 1744  Last data filed at 7/23/2024 1728  Gross per 24 hour   Intake 1000 ml   Output --   Net 1000 ml       Weight:       07/23/24  1627   Weight: 90.7 kg (200 lb)       Most recent vitals:   Vitals:    07/23/24 1548 07/23/24 1601 07/23/24 1627 07/23/24 1632   BP: 144/88 148/93  146/96   Pulse: 94 76  71   Resp: 16      Temp: 98.3 °F (36.8 °C)      SpO2: 96% 98%  97%   Weight:   90.7 kg (200 lb)    Height:   175.3 cm (69\")        Active LDAs/IV Access:   Lines, Drains & Airways       Active LDAs       Name Placement date Placement time Site Days    Peripheral IV 07/23/24 1625 Right Antecubital 07/23/24  1625  Antecubital  less than 1                    Labs (abnormal labs have a star):   Labs Reviewed   MAGNESIUM - Abnormal; Notable for the following components:       Result Value    Magnesium 2.6 (*)     All other " components within normal limits   CBC WITH AUTO DIFFERENTIAL - Abnormal; Notable for the following components:    WBC 10.96 (*)     Lymphocytes, Absolute 4.85 (*)     All other components within normal limits   BASIC METABOLIC PANEL - Normal    Narrative:     GFR Normal >60  Chronic Kidney Disease <60  Kidney Failure <15     URINALYSIS W/ MICROSCOPIC IF INDICATED (NO CULTURE) - Normal    Narrative:     Urine microscopic not indicated.   PROTIME-INR - Normal   APTT - Normal   TROPONIN - Normal    Narrative:     High Sensitive Troponin T Reference Range:  <14.0 ng/L- Negative Female for AMI  <22.0 ng/L- Negative Male for AMI  >=14 - Abnormal Female indicating possible myocardial injury.  >=22 - Abnormal Male indicating possible myocardial injury.   Clinicians would have to utilize clinical acumen, EKG, Troponin, and serial changes to determine if it is an Acute Myocardial Infarction or myocardial injury due to an underlying chronic condition.        HIGH SENSITIVITIY TROPONIN T 2HR   HEMOGLOBIN A1C   LIPID PANEL   POCT GLUCOSE FINGERSTICK   POCT GLUCOSE FINGERSTICK   POCT GLUCOSE FINGERSTICK   POCT GLUCOSE FINGERSTICK   CBC AND DIFFERENTIAL    Narrative:     The following orders were created for panel order CBC & Differential.  Procedure                               Abnormality         Status                     ---------                               -----------         ------                     CBC Auto Differential[[id number]]        Abnormal            Final result                 Please view results for these tests on the individual orders.       EKG:   ECG 12 Lead Stroke Evaluation   Preliminary Result   HEART RATE=72  bpm   RR Iqiqkoim=270  ms   TX Bvceojsr=679  ms   P Horizontal Axis=-5  deg   P Front Axis=-6  deg   QRSD Interval=94  ms   QT Xvvfzxny=301  ms   OFbG=700  ms   QRS Axis=22  deg   T Wave Axis=47  deg   - NORMAL ECG -   Sinus rhythm   Date and Time of Study:2024-07-23 16:07:41          Meds given  in ED:   Medications   diazePAM (VALIUM) tablet 5 mg (has no administration in time range)   sodium chloride 0.9 % flush 10 mL (has no administration in time range)   sodium chloride 0.9 % flush 10 mL (has no administration in time range)   sodium chloride 0.9 % infusion 40 mL (has no administration in time range)   atorvastatin (LIPITOR) tablet 40 mg (has no administration in time range)   ondansetron (ZOFRAN) injection 4 mg (has no administration in time range)   clopidogrel (PLAVIX) tablet 75 mg (has no administration in time range)   sodium chloride 0.9 % bolus 1,000 mL (0 mL Intravenous Stopped 7/23/24 2607)       Imaging results:  No radiology results for the last day    Ambulatory status:   - up ad melanie      Social issues:   Social History     Socioeconomic History    Marital status: Significant Other       Peripheral Neurovascular  Peripheral Neurovascular (Adult)  Peripheral Neurovascular WDL: (S) .WDL except, neurovascular assessment upper (stroke on friday. on blood thinners. woke up this morning with tingling in rt arm.)  RUE Neurovascular Assessment  Color RUE: no discoloration  Sensation RUE: tingling present (reports weakness when writing today)    Neuro Cognitive  Neuro Cognitive (Adult)  Cognitive/Neuro/Behavioral WDL: WDL, orientation, speech  Orientation: oriented x 4  Speech: logical, clear  Additional Documentation: Dizziness Assessment (Group)  NIH Stroke Scale  Interval: baseline  1a. Level of Consciousness: 0-->Alert, keenly responsive  1b. LOC Questions: 0-->Answers both questions correctly  1c. LOC Commands: 0-->Performs both tasks correctly  2. Best Gaze: 0-->Normal  3. Visual: 0-->No visual loss  4. Facial Palsy: 1-->Minor paralysis (flattened nasolabial fold, asymmetry on smiling)  5a. Motor Arm, Left: 0-->No drift, limb holds 90 (or 45) degrees for full 10 secs  5b. Motor Arm, Right: 0-->No drift, limb holds 90 (or 45) degrees for full 10 secs  6a. Motor Leg, Left: 0-->No drift, leg  holds 30 degree position for full 5 secs  6b. Motor Leg, Right: 0-->No drift, leg holds 30 degree position for full 5 secs  7. Limb Ataxia: 0-->Absent  8. Sensory: 1-->Mild-to-moderate sensory loss, patient feels pinprick is less sharp or is dull on the affected side, or there is a loss of superficial pain with pinprick, but patient is aware of being touched  9. Best Language: 0-->No aphasia, normal  10. Dysarthria: 1-->Mild-to-moderate dysarthria, patient slurs at least some words and, at worst, can be understood with some difficulty  11. Extinction and Inattention (formerly Neglect): 0-->No abnormality  Total (NIH Stroke Scale): 3  Dizziness Assessment  Dizziness Reported Symptoms: waves of dizziness, weak    Learning  Learning Assessment (Adult)  Learning Readiness and Ability: no barriers identified    Respiratory  Respiratory WDL  Respiratory WDL: WDL    Abdominal Pain       Pain Assessments  Pain (Adult)  (0-10) Pain Rating: Rest: 0  (0-10) Pain Rating: Activity: 0    NIH Stroke Scale  NIH Stroke Scale  Interval: baseline  1a. Level of Consciousness: 0-->Alert, keenly responsive  1b. LOC Questions: 0-->Answers both questions correctly  1c. LOC Commands: 0-->Performs both tasks correctly  2. Best Gaze: 0-->Normal  3. Visual: 0-->No visual loss  4. Facial Palsy: 1-->Minor paralysis (flattened nasolabial fold, asymmetry on smiling)  5a. Motor Arm, Left: 0-->No drift, limb holds 90 (or 45) degrees for full 10 secs  5b. Motor Arm, Right: 0-->No drift, limb holds 90 (or 45) degrees for full 10 secs  6a. Motor Leg, Left: 0-->No drift, leg holds 30 degree position for full 5 secs  6b. Motor Leg, Right: 0-->No drift, leg holds 30 degree position for full 5 secs  7. Limb Ataxia: 0-->Absent  8. Sensory: 1-->Mild-to-moderate sensory loss, patient feels pinprick is less sharp or is dull on the affected side, or there is a loss of superficial pain with pinprick, but patient is aware of being touched  9. Best Language:  0-->No aphasia, normal  10. Dysarthria: 1-->Mild-to-moderate dysarthria, patient slurs at least some words and, at worst, can be understood with some difficulty  11. Extinction and Inattention (formerly Neglect): 0-->No abnormality  Total (NIH Stroke Scale): 3    Francesca Goode RN  07/23/24 17:44 EDT

## 2024-07-24 ENCOUNTER — APPOINTMENT (OUTPATIENT)
Dept: CARDIOLOGY | Facility: HOSPITAL | Age: 69
End: 2024-07-24
Payer: COMMERCIAL

## 2024-07-24 ENCOUNTER — READMISSION MANAGEMENT (OUTPATIENT)
Dept: CALL CENTER | Facility: HOSPITAL | Age: 69
End: 2024-07-24
Payer: COMMERCIAL

## 2024-07-24 VITALS
HEART RATE: 67 BPM | DIASTOLIC BLOOD PRESSURE: 87 MMHG | BODY MASS INDEX: 29.62 KG/M2 | SYSTOLIC BLOOD PRESSURE: 138 MMHG | WEIGHT: 200 LBS | OXYGEN SATURATION: 100 % | RESPIRATION RATE: 18 BRPM | TEMPERATURE: 97.7 F | HEIGHT: 69 IN

## 2024-07-24 LAB
ALBUMIN SERPL-MCNC: 4 G/DL (ref 3.5–5.2)
ALBUMIN/GLOB SERPL: 1.3 G/DL
ALP SERPL-CCNC: 74 U/L (ref 39–117)
ALT SERPL W P-5'-P-CCNC: 16 U/L (ref 1–41)
ANION GAP SERPL CALCULATED.3IONS-SCNC: 9 MMOL/L (ref 5–15)
AST SERPL-CCNC: 17 U/L (ref 1–40)
BILIRUB SERPL-MCNC: 0.6 MG/DL (ref 0–1.2)
BUN SERPL-MCNC: 12 MG/DL (ref 8–23)
BUN/CREAT SERPL: 14 (ref 7–25)
CALCIUM SPEC-SCNC: 9 MG/DL (ref 8.6–10.5)
CHLORIDE SERPL-SCNC: 102 MMOL/L (ref 98–107)
CO2 SERPL-SCNC: 25 MMOL/L (ref 22–29)
CREAT SERPL-MCNC: 0.86 MG/DL (ref 0.76–1.27)
DEPRECATED RDW RBC AUTO: 40.6 FL (ref 37–54)
EGFRCR SERPLBLD CKD-EPI 2021: 93.7 ML/MIN/1.73
ERYTHROCYTE [DISTWIDTH] IN BLOOD BY AUTOMATED COUNT: 12.3 % (ref 12.3–15.4)
GLOBULIN UR ELPH-MCNC: 3.1 GM/DL
GLUCOSE SERPL-MCNC: 99 MG/DL (ref 65–99)
HCT VFR BLD AUTO: 50.2 % (ref 37.5–51)
HGB BLD-MCNC: 16.6 G/DL (ref 13–17.7)
MCH RBC QN AUTO: 29.9 PG (ref 26.6–33)
MCHC RBC AUTO-ENTMCNC: 33.1 G/DL (ref 31.5–35.7)
MCV RBC AUTO: 90.5 FL (ref 79–97)
PLATELET # BLD AUTO: 358 10*3/MM3 (ref 140–450)
PMV BLD AUTO: 9.1 FL (ref 6–12)
POTASSIUM SERPL-SCNC: 3.9 MMOL/L (ref 3.5–5.2)
PROT SERPL-MCNC: 7.1 G/DL (ref 6–8.5)
RBC # BLD AUTO: 5.55 10*6/MM3 (ref 4.14–5.8)
SODIUM SERPL-SCNC: 136 MMOL/L (ref 136–145)
WBC NRBC COR # BLD AUTO: 9.77 10*3/MM3 (ref 3.4–10.8)

## 2024-07-24 PROCEDURE — G0378 HOSPITAL OBSERVATION PER HR: HCPCS

## 2024-07-24 PROCEDURE — 80053 COMPREHEN METABOLIC PANEL: CPT | Performed by: PHYSICIAN ASSISTANT

## 2024-07-24 PROCEDURE — 93246 EXT ECG>7D<15D RECORDING: CPT

## 2024-07-24 PROCEDURE — 92610 EVALUATE SWALLOWING FUNCTION: CPT | Performed by: SPEECH-LANGUAGE PATHOLOGIST

## 2024-07-24 PROCEDURE — 85027 COMPLETE CBC AUTOMATED: CPT | Performed by: PHYSICIAN ASSISTANT

## 2024-07-24 PROCEDURE — 99214 OFFICE O/P EST MOD 30 MIN: CPT | Performed by: PSYCHIATRY & NEUROLOGY

## 2024-07-24 PROCEDURE — 36415 COLL VENOUS BLD VENIPUNCTURE: CPT | Performed by: PHYSICIAN ASSISTANT

## 2024-07-24 RX ADMIN — Medication 10 ML: at 08:31

## 2024-07-24 RX ADMIN — CLOPIDOGREL BISULFATE 75 MG: 75 TABLET, FILM COATED ORAL at 08:31

## 2024-07-24 NOTE — PROGRESS NOTES
ABBEY LAL Attestation Note    I supervised care provided by the midlevel provider.    The RUI and I have discussed this patient's history, physical exam, and treatment plan. I have reviewed the documentation and personally had a face to face interaction with the patient  I affirm the documentation and agree with the treatment and plan. I provided a substantive portion of the care of this patient.  I personally performed the physical exam, in its entirety.  My personal findings are documented in below:    History:  Patient with recent diagnosis of internal capsule stroke diagnosed at [facility] on Friday presents for evaluation of numbness in the right upper extremity, lightheaded feeling and some slurring of speech.  He first noticed the symptoms this morning after waking up.  He became concerned that he was having another stroke and then he says that his anxiety has began to escalate.  At the time of my evaluation he says he is feeling better now.  He feels like his speech has resolved back to normal.  He is not experiencing any weakness at this time.    Physical Exam:  General: No acute distress.  HENT: NCAT, PERRL, Nares patent.  Eyes: no scleral icterus.  Neck: trachea midline, no ROM limitations.  CV: Pink warm and well-perfused throughout  Respiratory: No distress or increased work of breathing  Abdomen: soft, no focal tenderness or rigidity  Musculoskeletal: no deformity.  Neuro: alert, moves all extremities, follows commands.  No facial droop.  Speech is clear and articulate.  No focal motor deficits  Skin: warm, dry.    Assessment and Plan:  Episode of change in speech /right upper extremity numbness and weakness: Neurology consulted.  MRI brain ordered.  Continue Lipitor and Plavix.  Serial neurochecks

## 2024-07-24 NOTE — PROGRESS NOTES
"DOS: 2024  NAME: Frederick Gorss   : 1955  PCP: Chelsey Palomo PA  Chief Complaint   Patient presents with    Tingling       Chief complaint: Stroke  Subjective: History confirmed as documented in Elodia Ayon's note.  Last Friday he developed slurred speech and right-sided weakness and was found to have a left subcortical infarct.  He thinks he was quite stressed at the time as they were in the midst of traveling.  Years ago he thinks he probably had fairly poorly controlled hypertension but this has come under good control recently with Norvasc over the last several years.  At the hospital he was found to have a left subcortical stroke.  He was started on Plavix.  He has an aspirin allergy.  Since coming home he felt mostly well but yesterday developed worsening right-sided weakness and numbness which prompted him to come back to the hospital.    Objective:  Vital signs: /87 (BP Location: Right arm, Patient Position: Lying)   Pulse 67   Temp 97.7 °F (36.5 °C) (Oral)   Resp 18   Ht 175.3 cm (69\")   Wt 90.7 kg (200 lb)   SpO2 100%   BMI 29.53 kg/m²    Gen: NAD, vitals reviewed  MS: oriented x3, recent/remote memory intact, normal attention/concentration, language intact, no neglect.  CN: visual acuity grossly normal, PERRL, EOMI, right facial droop, no dysarthria  Motor: Very mild right upper extremity drift, 5/5 throughout upper and lower extremities, normal tone  Sensory: intact to cold temperature and vibration throughout  Reflexes: Slightly brisk right upper extremity    Laboratory results:  Lab Results   Component Value Date    GLUCOSE 99 2024    CALCIUM 9.0 2024     2024    K 3.9 2024    CO2 25.0 2024     2024    BUN 12 2024    CREATININE 0.86 2024    BCR 14.0 2024    ANIONGAP 9.0 2024     Lab Results   Component Value Date    WBC 9.77 2024    HGB 16.6 2024    HCT 50.2 2024    MCV 90.5 2024 "     07/24/2024     Lab Results   Component Value Date    LDL 86 07/23/2024         Lab 07/23/24  1625   HEMOGLOBIN A1C 5.50        Review of labs: CBC, BMP unremarkable, LDL 86, p2y12 130    Review and interpretation of imaging: I personally reviewed his MRI brain performed during this hospital stay which shows a subacute left subcortical infarct as well as another subacute right frontal corona radiata infarct.  CTA and MRI from the outside facility obtained and reviewed.  CTA shows no significant intracranial or extracranial stenosis.  The MRI does not show the right frontal corona radiata stroke.  MRI also shows fairly advanced white matter disease consistent with hypertension    Diagnoses:  Stroke, left and right subcortical, due to small vessel disease  Cerebral white matter disease  Mixed hyperlipidemia    Comment: Subacute left and right subcortical strokes.  The second stroke in the right frontal lobe was not visualized on the initial MRI in [place].  This may have been technical.  I think both of these lesions are probably lacunar infarcts however given the bilaterality I would prefer to check a Holter monitor on discharge.  He has been prone to episodic dizziness in the past.    Plan:  1.  Continue Plavix, statin  2.  2-week Holter on discharge  3.  I recommend he remain off work until 4 weeks post stroke.    Management discussed with patient, family, Dr. Kevin Hernandez for discharge from a neurology standpoint.  Follow-up with me in a few months    Total visit time 50 minutes including chart/imaging review, patient assessment, care coordination, patient/family education.

## 2024-07-24 NOTE — PLAN OF CARE
Goal Outcome Evaluation:  Plan of Care Reviewed With: patient, spouse           Outcome Evaluation: Clinical swallow evaluation completed recommend regular solids and thin liquids, meds whole as tolerated and small bites and sips.      Anticipated Discharge Disposition (SLP): home          SLP Swallowing Diagnosis: swallow WFL/no suspected pharyngeal impairment (07/24/24 1000)

## 2024-07-24 NOTE — THERAPY EVALUATION
Acute Care - Speech Language Pathology   Swallow Initial Evaluation [facility]     Patient Name: Frederick Gross  : 1955  MRN: [id number]  Today's Date: 2024               Admit Date: 2024    Visit Dx:     ICD-10-CM ICD-9-CM   1. Numbness and tingling of right arm  R20.0 782.0    R20.2    2. History of CVA (cerebrovascular accident)  Z86.73 V12.54   3. Hypertension, unspecified type  I10 401.9   4. History of hypertension  Z86.79 V12.59     Patient Active Problem List   Diagnosis    Acute CVA (cerebrovascular accident)     Past Medical History:   Diagnosis Date    Arthritis      Past Surgical History:   Procedure Laterality Date    HERNIA REPAIR      NASAL POLYP SURGERY      TONSILLECTOMY         SLP Recommendation and Plan  SLP Swallowing Diagnosis: swallow WFL/no suspected pharyngeal impairment (24 1000)  SLP Diet Recommendation: regular textures, thin liquids (24 1000)  Recommended Precautions and Strategies: small bites of food and sips of liquid, upright posture during/after eating (24 1000)  SLP Rec. for Method of Medication Administration: meds whole, as tolerated (24 1000)     Monitor for Signs of Aspiration: yes, notify SLP if any concerns (24 1000)  Recommended Diagnostics: SLE/Cog/Motor Speech Evaluation (24 1000)  Swallow Criteria for Skilled Therapeutic Interventions Met: no problems identified which require skilled intervention (24 1000)  Anticipated Discharge Disposition (SLP): home (24 1000)     Therapy Frequency (Swallow): evaluation only (24 1000)     Oral Care Recommendations: Oral Care BID/PRN (24 1000)                                        Plan of Care Reviewed With: patient, spouse  Outcome Evaluation: Clinical swallow evaluation completed recommend regular solids and thin liquids, meds whole as tolerated and small bites and sips.      SWALLOW EVALUATION (Last 72 Hours)       SLP Adult Swallow Evaluation       Row  "Name 07/24/24 1000                   Rehab Evaluation    Document Type evaluation  -KA        Subjective Information no complaints  -KA        Patient Observations alert;cooperative  -KA        Patient Effort good  -KA        Symptoms Noted During/After Treatment none  -KA           General Information    Patient Profile Reviewed yes  -KA        Pertinent History Of Current Problem pt admitted with transient dysarthria MRI revealed \"The patient is noted to have 2 acute infarcts. One is within the left  corona radiata/basal ganglia, and the other is noted within the right  frontal corona radiata. No abnormal enhancement. \" Patient reports his symptoms have resolved  -KA        Current Method of Nutrition regular textures;thin liquids  -KA        Precautions/Limitations, Vision WFL  -KA        Precautions/Limitations, Hearing WFL  -KA        Prior Level of Function-Communication WFL  -KA        Prior Level of Function-Swallowing no diet consistency restrictions;safe, efficient swallowing in all situations  -KA        Plans/Goals Discussed with patient  -KA        Barriers to Rehab none identified  -KA        Patient's Goals for Discharge return home  -KA        Family Goals for Discharge family did not state  -KA           Oral Musculature and Cranial Nerve Assessment    Oral Labial or Buccal Impairment, Detail, Cranial Nerve VII (Facial): right labial droop;other (see comments)  question slight right labial droop during lateralization  -KA           General Eating/Swallowing Observations    Respiratory Support Currently in Use room air  -KA        Eating/Swallowing Skills self-fed  -KA        Positioning During Eating upright in bed  -KA        Utensils Used spoon;cup;straw  -KA        Consistencies Trialed regular textures;soft to chew textures;chopped;mixed consistency;pureed;thin liquids  -KA           Clinical Swallow Eval    Clinical Swallow Evaluation Summary Pt demonstrated no overt s/s of pen/asp with thins " via cup and straw, puree, mechanical soft/mixed, regular solids. Mastication WNL and adequate laryngeal elevation  -           SLP Evaluation Clinical Impression    SLP Swallowing Diagnosis swallow WFL/no suspected pharyngeal impairment  -        Functional Impact no impact on function  -        Swallow Criteria for Skilled Therapeutic Interventions Met no problems identified which require skilled intervention  -           Recommendations    Therapy Frequency (Swallow) evaluation only  -        SLP Diet Recommendation regular textures;thin liquids  -        Recommended Diagnostics SLE/Cog/Motor Speech Evaluation  -        Recommended Precautions and Strategies small bites of food and sips of liquid;upright posture during/after eating  -        Oral Care Recommendations Oral Care BID/PRN  -        SLP Rec. for Method of Medication Administration meds whole;as tolerated  -        Monitor for Signs of Aspiration yes;notify SLP if any concerns  -        Anticipated Discharge Disposition (SLP) home  -                  User Key  (r) = Recorded By, (t) = Taken By, (c) = Cosigned By      Initials Name Effective Dates    Madan Galindo SLP 01/05/24 -                     EDUCATION  The patient has been educated in the following areas:   Dysphagia (Swallowing Impairment).                Time Calculation:    Time Calculation- SLP       Row Name 07/24/24 1038             Time Calculation- SLP    SLP Start Time 0900  -KA      SLP Received On 07/24/24  -         Untimed Charges    25692-HL Eval Oral Pharyng Swallow Minutes 55  -KA         Total Minutes    Untimed Charges Total Minutes 55  -KA       Total Minutes 55  -KA                User Key  (r) = Recorded By, (t) = Taken By, (c) = Cosigned By      Initials Name Provider Type    Madan Galindo SLP Speech and Language Pathologist                    Therapy Charges for Today       Code Description Service Date Service Provider Modifiers Qty     [id number] [facility] ORAL PHARYNG SWALLOW 4 7/24/2024 Madan Wells, SLP GN 1                 DENNY Lisa  7/24/2024

## 2024-07-24 NOTE — PROGRESS NOTES
MD ATTESTATION NOTE    The RUI and I have discussed this patient's history, physical exam, and treatment plan.  I have reviewed the documentation and personally had a face to face interaction with the patient. I affirm the documentation and agree with the treatment and plan.  The attached note describes my personal findings.      I provided a substantive portion of the care of the patient.  I personally performed the physical exam in its entirety, and below are my findings.        Brief HPI: This patient is a 69-year-old male with a recent diagnosis of an internal capsule stroke at an outlying facility admitted to the observation unit due to a recurrent strokelike symptoms.  He did report some numbness and tingling to his right upper extremity as well as some slurring of speech.  This morning, he reports that all symptoms have resolved and he feels back to his baseline.      PHYSICAL EXAM  ED Triage Vitals   Temp Heart Rate Resp BP SpO2   07/23/24 1548 07/23/24 1548 07/23/24 1548 07/23/24 1548 07/23/24 1548   98.3 °F (36.8 °C) 94 16 144/88 96 %      Temp src Heart Rate Source Patient Position BP Location FiO2 (%)   07/23/24 1943 07/23/24 1943 07/23/24 1943 07/23/24 1943 --   Oral Monitor Lying Left arm          GENERAL: Resting comfortably and in no acute distress, nontoxic in appearance  HENT: nares patent  EYES: no scleral icterus  CV: regular rhythm, normal rate, no M/R/G  RESPIRATORY: normal effort, lungs clear bilaterally  ABDOMEN: soft, nontender, no rebound or guarding  MUSCULOSKELETAL: no deformity, no edema  NEURO: alert, moves all extremities, follows commands  PSYCH:  calm, cooperative  SKIN: warm, dry    Vital signs and nursing notes reviewed.        Plan: The patient's MRI brain did reveal 2 areas of acute infarct without any hemorrhagic conversion.  We will monitor in the observation unit and ask neurology to see in morning consultation.  Further planning to follow.

## 2024-07-24 NOTE — PLAN OF CARE
Goal Outcome Evaluation:   Plan of care reviewed with patient   Outcome summary: Patient AOX4, vitals stable, up without assist, NIH 1, MRI completed overnight, copies of patient previous imaging in chart, plan for neurology to see today.

## 2024-07-24 NOTE — SIGNIFICANT NOTE
07/24/24 0830   OTHER   Discipline occupational therapist   Rehab Time/Intention   Session Not Performed   (Pt reports he is getting up independently to go to bathroom, denies any weakness with his RUE and is texting on his phone with R hand upon arrival. No OT needs reported at this time.)

## 2024-07-24 NOTE — PROGRESS NOTES
ED OBSERVATION PROGRESS/DISCHARGE SUMMARY    Date of Admission: 7/23/2024   LOS: 0 days   PCP: Chelsey Palomo PA    Final Diagnosis subacute left and right subcortical strokes      Subjective     Hospital Outcome:     Frederick Gross is a 69 y.o. male who presented to the ED via private vehicle complaining of acute recurrence of strokelike symptoms.  Patient was initially diagnosed with a stroke in [place] on Friday.  He had upper extremity tingling and weakness and then some slight slurred speech.  Has residual mild facial droop which is reported to be similar.  Patient reports he has been lightheaded but no spinning or dizziness.  No chest pain or shortness of breath.  Symptoms began to recur this morning around 6 AM.  Patient has an ASA allergy [place] started on Plavix and Lipitor when discharged from the hospital in [facility] where he was seen for the initial stroke.  Accompanying paperwork showed that the patient had a left external capsular infarct.  Patient's case was discussed with stroke neurology and they recommended MRI of the brain and admission to the observation unit.     ED workup showed a troponin of 8 9 with a delta of 1, sodium 140, potassium 4, anion gap 10, CO2 26, creatinine 1.08, glucose 87, INR 1.04, PTT 26.2, WBC 10.96, hemoglobin 15.1, platelets 350.  UA was negative.  NIH was a 3.  ECG was done at 1607 and showed sinus with a rate of 72, normal axis, normal intervals, no acute ischemic changes, no STEMI.    7/24: This morning patient states that he feels that his slurred speech has resolved.    Patient was seen and evaluated by Dr. Malin with the neurology service who has reviewed patient's imaging and feels that this suggests subacute left and right subcortical strokes second stroke of the right frontal lobe not visualized on initial MRI in [place] and thought to be lacunar infarcts but given bilaterally plan for discharge home with Holter monitoring.  Recommend  continuation of Plavix and statin and no change in medications at this time.  Patient is agreeable to plan.    ROS:  General: no fevers, chills  Respiratory: no cough, dyspnea  Cardiovascular: no chest pain, palpitations  Abdomen: No abdominal pain, nausea, vomiting, or diarrhea  Neurologic: No focal weakness    Objective   Physical Exam:  I have reviewed the vital signs.  Temp:  [98.2 °F (36.8 °C)-98.4 °F (36.9 °C)] 98.4 °F (36.9 °C)  Heart Rate:  [65-94] 74  Resp:  [14-16] 14  BP: (121-148)/(79-96) 121/87  General Appearance:    Alert, cooperative, no distress  Head:    Normocephalic, atraumatic  Eyes:    Sclerae anicteric  Neck:   Supple, no mass  Lungs: Clear to auscultation bilaterally, respirations unlabored  Heart: Regular rate and rhythm, S1 and S2 normal, no murmur, rub or gallop  Abdomen:  Soft, non-tender, bowel sounds active, nondistended  Extremities: No clubbing, cyanosis, or edema to lower extremities  Pulses:  2+ and symmetric in distal lower extremities  Skin: No rashes   Neurologic: Oriented x3, Normal strength to extremities    Results Review:    I have reviewed the labs, radiology results and diagnostic studies.    Results from last 7 days   Lab Units 07/23/24  1625   WBC 10*3/mm3 10.96*   HEMOGLOBIN g/dL 15.1   HEMATOCRIT % 47.1   PLATELETS 10*3/mm3 350     Results from last 7 days   Lab Units 07/23/24  1625   SODIUM mmol/L 140   POTASSIUM mmol/L 4.0   CHLORIDE mmol/L 104   CO2 mmol/L 26.0   BUN mg/dL 16   CREATININE mg/dL 1.08   CALCIUM mg/dL 9.0   GLUCOSE mg/dL 87     Imaging Results (Last 24 Hours)       Procedure Component Value Units Date/Time    MRI Brain With & Without Contrast [[id number]] Collected: 07/24/24 0357     Updated: 07/24/24 0421    Narrative:      BRAIN MRI WITH AND WITHOUT CONTRAST     HISTORY: right sided numbness, right facial droop, dysarthria;  R20.0-Anesthesia of skin; R20.2-Paresthesia of skin; Z86.73-Personal  history of transient ischemic attack (TIA), and cerebral  infarction  without residual deficits; I10-Essential (primary) hypertension;  Z86.79-Personal history of other diseases of the circulatory system     COMPARISON: None.     FINDINGS:  Multiplanar images of the head were obtained without and with  gadolinium. There is an area of restricted diffusion which is noted  within the left corona radiata/basal ganglia. An additional small focus  of restricted diffusion is noted within the right frontal corona  radiata. No additional areas of restricted diffusion are seen.  Ventricles are normal in size. There is no midline shift or mass effect.  There is periventricular and deep white matter microangiopathic change.  The intracranial flow voids appear intact. Focus of susceptibility  artifact is noted within the left cerebellar hemisphere. Multiple  additional foci of susceptibility artifact are noted within the cerebral  hemispheres bilaterally, as well as the left santi, left thalamus, and  bilateral basal ganglia. Appearance is favored to represent chronic  hemosiderin deposition from microhemorrhages. This distribution is  nonspecific. Both hypertensive microhemorrhages and amyloid angiopathy  would be in the differential. Postcontrast imaging does not demonstrate  any abnormal enhancement. Mucosal thickening is noted within the  paranasal sinuses. With mucous retention cyst noted within the right  maxillary sinus.          Impression:      1. The patient is noted to have 2 acute infarcts. One is within the left  corona radiata/basal ganglia, and the other is noted within the right  frontal corona radiata. No abnormal enhancement.        This report was finalized on 7/24/2024 4:18 AM by Dr. Dhara Hill M.D on Workstation: [id number]               I have reviewed the medications.  ---------------------------------------------------------------------------------------------  Assessment & Plan   Assessment/Problem List    Acute CVA (cerebrovascular  accident)      Plan:    Left external capsular infarct/slurred speech, right arm tingling and weakness  -MRI brain shows patient is noted to have 2 lacunar infarcts.  One is within the left corona radiata/basal ganglia, and the other is noted within the right frontal corona radiata.  -Neurology following  -Continue Lipitor and Plavix  -Serial neurochecks  -Cardiac monitoring uneventful  -P2Y12 130      Hyperlipidemia  -Continue statin as above  -Lipid panel unremarkable     History of hypokalemia  -Resolved        VTE Prophylaxis:  Mechanical VTE prophylaxis orders are present.       Disposition: Home    Follow-up after Discharge: PCP and neurology    This note will serve as a discharge summary    MEL Hoyos 07/24/24 05:00 EDT    I have worn appropriate PPE during this patient encounter, sanitized my hands both with entering and exiting patient's room.      38 minutes has been spent by [facility] providers in the care of this patient while under observation status

## 2024-07-25 NOTE — OUTREACH NOTE
Prep Survey      Flowsheet Row Responses   [facility]? [place]   Is LACE score < 7 ? Yes   Eligibility Readm Mgmt   Discharge diagnosis CVA   Does the patient have one of the following disease processes/diagnoses(primary or secondary)? Stroke   Does the patient have Home health ordered? No   Is there a DME ordered? No   Prep survey completed? Yes            ELSA BRIONES - Registered Nurse

## 2024-07-26 ENCOUNTER — READMISSION MANAGEMENT (OUTPATIENT)
Dept: CALL CENTER | Facility: HOSPITAL | Age: 69
End: 2024-07-26
Payer: COMMERCIAL

## 2024-07-26 NOTE — OUTREACH NOTE
Stroke Week 1 Survey      Flowsheet Row Responses   [facility] facility patient discharged from? [place]   Does the patient have one of the following disease processes/diagnoses(primary or secondary)? Stroke   Week 1 attempt successful? No   Unsuccessful attempts Attempt 1            Sailaja EDOUARD - Registered Nurse

## 2024-07-30 ENCOUNTER — READMISSION MANAGEMENT (OUTPATIENT)
Dept: CALL CENTER | Facility: HOSPITAL | Age: 69
End: 2024-07-30
Payer: COMMERCIAL

## 2024-07-30 NOTE — OUTREACH NOTE
Stroke Week 1 Survey      Flowsheet Row Responses   [facility] patient discharged from? [place]   Does the patient have one of the following disease processes/diagnoses(primary or secondary)? Stroke   Week 1 attempt successful? Yes   Call start time 1609   Call end time 1616   Discharge diagnosis CVA   Person spoke with today (if not patient) and relationship pt   Meds reviewed with patient/caregiver? Yes   Is the patient having any side effects they believe may be caused by any medication additions or changes? No   Does the patient have all medications ordered at discharge? N/A   Is the patient taking all medications as directed (includes completed medication regime)? Yes   Did the patient receive a copy of their discharge instructions? Yes   Nursing interventions Reviewed instructions with patient   What is the patient's perception of their health status since discharge? Improving   Nursing interventions Nurse provided patient education   Is the patient/caregiver able to teach back the risk factors for a stroke? High blood pressure-goal below 120/80, High Cholesterol, History of TIAs   Is the patient/caregiver able to teach back signs and symptoms related to disease process for when to call PCP? Yes   Is the patient/caregiver able to teach back signs and symptoms related to disease process for when to call 911? Yes   If the patient is a current smoker, are they able to teach back resources for cessation? Not a smoker   Is the patient/caregiver able to teach back the hierarchy of who to call/visit for symptoms/problems? PCP, Specialist, Home health nurse, Urgent Care, ED, 911 Yes   Is the patient able to teach back FAST for Stroke? B alance: Watch for sudden loss of balance, E yes: Check for vision loss, F ace: Look for an uneven smile, A rm: Check if one arm is weak, S peech: Listen for slurred speech, T alessandra: Call 9-1-1 right away   Week 1 call completed? Yes   Is the patient interested in additional  calls from an ambulatory ? No   Would this patient benefit from a Referral to [facility] Social Work? No   Wrap up additional comments Pt reports he is weak feeling, and denies any tingling in right arm. No medication changes. Pt advised to make a neurology/PCP fu appts.   Call end time 1616            Caren RUIZ - Registered Nurse

## 2024-08-07 ENCOUNTER — READMISSION MANAGEMENT (OUTPATIENT)
Dept: CALL CENTER | Facility: HOSPITAL | Age: 69
End: 2024-08-07
Payer: COMMERCIAL

## 2024-08-07 NOTE — OUTREACH NOTE
Stroke Week 2 Survey      Flowsheet Row Responses   [facility] patient discharged from? [place]   Does the patient have one of the following disease processes/diagnoses(primary or secondary)? Stroke   Week 2 attempt successful? Yes   Call start time 1433   Call end time 1435   Discharge diagnosis CVA   Meds reviewed with patient/caregiver? Yes   Is the patient taking all medications as directed (includes completed medication regime)? Yes   Does the patient have a primary care provider?  Yes   Has the patient kept scheduled appointments due by today? Yes   Has home health visited the patient within 72 hours of discharge? N/A   Does the patient require any assistance with activities of daily living such as eating, bathing, dressing, walking, etc.? No   Does the patient have any residual symptoms from stroke/TIA? No   Does the patient understand the diet ordered at discharge? Yes   Did the patient receive a copy of their discharge instructions? Yes   Nursing interventions Reviewed instructions with patient   What is the patient's perception of their health status since discharge? Improving   Is the patient able to teach back FAST for Stroke? B alance: Watch for sudden loss of balance, E yes: Check for vision loss, F ace: Look for an uneven smile, A rm: Check if one arm is weak, S peech: Listen for slurred speech, T alessandra: Call 9-1-1 right away   Is the patient/caregiver able to teach back the risk factors for a stroke? High Cholesterol, High blood pressure-goal below 120/80, Carotid or other artery disease   Is the patient/caregiver able to teach back signs and symptoms related to disease process for when to call PCP? Yes   Is the patient/caregiver able to teach back signs and symptoms related to disease process for when to call 911? Yes   If the patient is a current smoker, are they able to teach back resources for cessation? Not a smoker   Is the patient/caregiver able to teach back the hierarchy of who to  call/visit for symptoms/problems? PCP, Specialist, Home health nurse, Urgent Care, ED, 911 Yes   Week 2 call completed? Yes   Call end time 2112            Loraine RUIZ - Licensed Nurse

## 2024-08-16 ENCOUNTER — READMISSION MANAGEMENT (OUTPATIENT)
Dept: CALL CENTER | Facility: HOSPITAL | Age: 69
End: 2024-08-16
Payer: COMMERCIAL

## 2024-08-16 NOTE — OUTREACH NOTE
Stroke Week 3 Survey      Flowsheet Row Responses   [facility] patient discharged from? [place]   Does the patient have one of the following disease processes/diagnoses(primary or secondary)? Stroke   Week 3 attempt successful? Yes   Call start time 1513   Call end time 1518   Discharge diagnosis CVA   Is the patient taking all medications as directed (includes completed medication regime)? Yes   Has the patient kept scheduled appointments due by today? Yes   Does the patient require any assistance with activities of daily living such as eating, bathing, dressing, walking, etc.? No   Does the patient have any residual symptoms from stroke/TIA? No   Comments Patient reports that his RUE numbness/tingling weakness has resolved, he has full use again. Patient reports no further issues with speech, during call the patient's speech was clear and he followed conversation well. No further facial droop is noted by pt, per his report.   What is the patient's perception of their health status since discharge? Returned to baseline/stable   Nursing interventions Nurse provided patient education   Is the patient able to teach back FAST for Stroke? B alance: Watch for sudden loss of balance, E yes: Check for vision loss, F ace: Look for an uneven smile, A rm: Check if one arm is weak, S peech: Listen for slurred speech, T alessandra: Call 9-1-1 right away   Is the patient/caregiver able to teach back the risk factors for a stroke? High blood pressure-goal below 120/80   Is the patient/caregiver able to teach back signs and symptoms related to disease process for when to call PCP? Yes            Sailaja EDOUARD - Registered Nurse

## 2024-10-21 ENCOUNTER — OFFICE VISIT (OUTPATIENT)
Dept: NEUROLOGY | Facility: CLINIC | Age: 69
End: 2024-10-21
Payer: COMMERCIAL

## 2024-10-21 VITALS
HEART RATE: 77 BPM | BODY MASS INDEX: 30.36 KG/M2 | SYSTOLIC BLOOD PRESSURE: 132 MMHG | HEIGHT: 69 IN | WEIGHT: 205 LBS | OXYGEN SATURATION: 97 % | DIASTOLIC BLOOD PRESSURE: 74 MMHG

## 2024-10-21 DIAGNOSIS — Z86.73 CHRONIC ARTERIAL ISCHEMIC STROKE: ICD-10-CM

## 2024-10-21 DIAGNOSIS — G72.9 MYOPATHY: Primary | ICD-10-CM

## 2024-10-21 PROCEDURE — 99214 OFFICE O/P EST MOD 30 MIN: CPT | Performed by: PSYCHIATRY & NEUROLOGY

## 2024-10-21 NOTE — PROGRESS NOTES
"DOS: 10/21/2024  NAME: Frederick Gross   : 1955  PCP: Chelsey Palomo PA  Chief Complaint   Patient presents with    Stroke       Chief complaint: Stroke follow-up  Subjective: 69-year-old man seen by me in the hospital for multiple lacunar strokes.  He had been traveling and had a left subcortical infarct for which he was seen in [place].  He had come back home and then had some recurrent symptoms in the setting of what was felt to be dehydration and overexertion from going back to work fairly soon after his infarct.  MRI showed a late acute left subcortical stroke and an acute right subcortical corona radiata stroke.  He was started on Plavix.  He had an aspirin allergy which precluded DAPT.  He was started on high-dose Lipitor.  After discharge he had some difficulty with fatigue and muscle weakness.  His primary care first reduced the Lipitor and later switched to Crestor.  The Crestor was also difficult to tolerate and he went back to 40 mg of Lipitor.  Despite that he has continued to complain of proximal muscle weakness.  Because of that he decided to discontinue Lipitor about 3 days ago and has been feeling much better.  He also endorses fairly persistent fatigue and at times depressed mood since his stroke.  He denies any recurrent unilateral weakness or numbness.    Objective:  Vital signs: /74   Pulse 77   Ht 175.3 cm (69\")   Wt 93 kg (205 lb)   SpO2 97%   BMI 30.27 kg/m²    Exam:  vitals reviewed  MS: oriented x3, recent/remote memory intact, normal attention/concentration, language intact, no neglect.  CN: visual acuity grossly normal, PERRL, EOMI, no facial droop, no dysarthria  Motor: 5/5 throughout upper and lower extremities, normal tone  Sensory: intact to cold temperature and vibration throughout  Gait: Normal station, no ataxia    Laboratory results:  Lab Results   Component Value Date    LDL 86 2024            Review of labs: LDL in the hospital was 86, repeat lipid " panel and CK ordered for today    Review and interpretation of imaging: I personally reviewed his MRI of the brain performed during his hospitalization which showed an acute left subcortical stroke and an acute right corona radiata stroke.  Radiology report reviewed.  CTA showed no significant flow-limiting stenosis in the extracranial or intracranial circulation.      Zio patch showed some PACs but no A-fib    Diagnoses:  Bilateral lacunar strokes felt to be on the basis of small vessel disease  Essential hypertension  Mixed hyperlipidemia  Statin myopathy    Assessment/comments: With regard to his stroke he is doing very well with no residual symptoms.  He has some persistent generalized weakness which is most likely related to statin myopathy.  Given his previously well-controlled cholesterol I think he would be reasonable to stop his statin.  We will recheck a lipid panel and CK today.  For stroke prevention he should stay on Plavix monotherapy and continue close blood pressure monitoring.    Plan:  1.  Plavix 75 mg  2.  Okay to discontinue statin.  Check a CK and lipid panel today  3.  Monitor blood pressure frequently at home    I spent a total of 30 minutes on this clinical encounter including chart/records review, review of laboratory data, personal review of imaging studies, patient counseling, and care coordination.    Follow-up with me on an as-needed basis

## 2024-10-22 LAB
CHOLEST SERPL-MCNC: 140 MG/DL (ref 0–200)
CK SERPL-CCNC: 105 U/L (ref 20–200)
HDLC SERPL-MCNC: 43 MG/DL (ref 40–60)
LDLC SERPL CALC-MCNC: 67 MG/DL (ref 0–100)
TRIGL SERPL-MCNC: 178 MG/DL (ref 0–150)
VLDLC SERPL CALC-MCNC: 30 MG/DL (ref 5–40)

## 2024-10-24 ENCOUNTER — TELEPHONE (OUTPATIENT)
Dept: NEUROLOGY | Facility: CLINIC | Age: 69
End: 2024-10-24
Payer: COMMERCIAL

## 2024-10-24 NOTE — TELEPHONE ENCOUNTER
----- Message from Diaz Madden sent at 10/22/2024  7:37 PM EDT -----  Please let him know his lipid panel is at goal. We had talked about going off his statin due to side effects. He should have his PCP monitor LDL and if it goes up again maybe try a statin again at a lower dose.